# Patient Record
(demographics unavailable — no encounter records)

---

## 2024-10-15 NOTE — PROCEDURE
[FreeTextEntry1] : PFTs demonstrate airways obstruction with nonspecific bronchodilator response Increased lung volumes noted mild gas exchange impairment that corrects for volume Chest CT reviewed pleural-based irregular mass in right upper lung zone subjacent to right breast suggestive of injury from prior breast radiation.  Mild bronchiectasis noted Chest x-ray demonstrates linear opacity in right upper lobe

## 2024-10-15 NOTE — REASON FOR VISIT
[Initial] : an initial visit [Abnormal CXR/ Chest CT] : an abnormal CXR/ chest CT [Other: _____] : [unfilled]

## 2024-10-15 NOTE — HISTORY OF PRESENT ILLNESS
[Never] : never [TextBox_4] : 70 yr old woman with a history of bilateral breast intraductal carcinoma s/p radiation treatment more than 20 years ago, presenting with concern over new CT Chest scan and chronic difficulty breathing. Recent chest CT scan performed revealed a right, anterior upper lobe opacity near the pleural lining, sent in from PCP with concern about possible malignancy. Patient denies pleuritic chest pain on inspiration or recent fevers. Patient also remarks about dyspnea she has had since she was 9, reporting either a severe encephalitic or celullitis infection, causing her to have breathing difficulties and dysphagia. She describes her having the sensation of not being able to catch a "sufficiently deep" breath, though when she does she will feel better for several minutes. Reports wheezes at times. Endorses orthopnea at night, worsens at night time overall, and has been feeling congested in her lungs. With regards to her dysphagia, she notes having to chew in a special manner to avoid choking.   Radiation therapy to the right breast was done over 20 years ago.  Patient denies any recent change in her respiratory symptoms.  Was once treated for bronchitis with an inhaler that worked really well. She reports multiple prior normal chest x-rays during routine physicals

## 2024-10-15 NOTE — ASSESSMENT
[FreeTextEntry1] : 70-year-old female with 2 separate issues 1) abnormality on chest CT and chest x-ray.  Most likely consistent with prior radiation.  However it would be surprising that this was not documented on reportedly multiple previous x-rays.  Have asked family to attempt to obtain prior chest x-rays.  If no x-ray can be obtained would obtain PET scan, although most likely this is a postradiation injury and unlikely to be a cause of clinical concern.  2) shortness of breath.  Likely related to asthma will give 1 month trial of inhaler

## 2024-10-15 NOTE — PHYSICAL EXAM
[No Acute Distress] : no acute distress [Normal Oropharynx] : normal oropharynx [Normal Appearance] : normal appearance [No Neck Mass] : no neck mass [Normal S1, S2] : normal s1, s2 [No Murmurs] : no murmurs [No Resp Distress] : no resp distress [Clear to Auscultation Bilaterally] : clear to auscultation bilaterally

## 2024-11-19 NOTE — HISTORY OF PRESENT ILLNESS
[Never] : never [TextBox_4] : Prior:  70 yr old woman with a history of bilateral breast intraductal carcinoma s/p radiation treatment more than 20 years ago, presenting with concern over new CT Chest scan and chronic difficulty breathing. Recent chest CT scan performed revealed a right, anterior upper lobe opacity near the pleural lining, sent in from PCP with concern about possible malignancy. Patient denies pleuritic chest pain on inspiration or recent fevers. Patient also remarks about dyspnea she has had since she was 9, reporting either a severe encephalitic or celullitis infection, causing her to have breathing difficulties and dysphagia. She describes her having the sensation of not being able to catch a "sufficiently deep" breath, though when she does she will feel better for several minutes. Reports wheezes at times. Endorses orthopnea at night, worsens at night time overall, and has been feeling congested in her lungs. With regards to her dysphagia, she notes having to chew in a special manner to avoid choking.   Radiation therapy to the right breast was done over 20 years ago.  Patient denies any recent change in her respiratory symptoms.  Was once treated for bronchitis with an inhaler that worked really well. She reports multiple prior normal chest x-rays during routine physicals  Current: Continues on Arnuity inhaler ran out 2 days ago. Does not notice much change in breathing Could not find prior x-rays for review.

## 2024-11-19 NOTE — REASON FOR VISIT
[Follow-Up] : a follow-up visit [Abnormal CXR/ Chest CT] : an abnormal CXR/ chest CT [Other: _____] : [unfilled]

## 2024-11-19 NOTE — ASSESSMENT
Writer received call back from pt's son Lonnie. Writer offered first available consult appt of 1/3. Lonnie stated he did not want pt waiting that long. Writer offered placing the pt on a cancellation list and offered numbers for other wound clinics. Writer provided wound clinic numbers for [facility], and [facility]. Pt placed on cancellation list.   [FreeTextEntry1] : Lung function improved on Arnuity.  Still not normalized.  Will change to Trelegy 200, samples provided Large component of anxiety and dysfunctional breathing contributing to symptoms.  Discussed and demonstrated abdominal breathing techniques.  In terms of imaging patient does not wish to proceed with PET/CT because she is concerned about radiation exposure.  She did agree to a 3-month interval follow-up chest x-ray

## 2024-12-19 NOTE — ADDENDUM
[FreeTextEntry1] :  I, Morgan Corderotoriin, acted solely as a scribe for Dr. George Myles M.D. on this date 12/19/2024.   All medical record entries made by the Scribe were at my, Dr. George Myles M.D., direction and personally dictated by me on 12/19/2024. I have reviewed the chart and agree that the record accurately reflects my personal performance of the history, physical exam, assessment and plan. I have also personally directed, reviewed, and agreed with the chart.

## 2024-12-19 NOTE — ASSESSMENT
[FreeTextEntry1] : Demonstrated and practiced abdominal breathing techniques with the patient during today's visit. Recommended incorporating a 20-minute daily walk with a focus on maintaining proper breathing form. Discussed the use of an incentive spirometer to encourage active and effective breathing. Advised the patient to continue taking Arnuity as prescribed. Scheduled a follow-up appointment in three months

## 2024-12-19 NOTE — HISTORY OF PRESENT ILLNESS
[Never] : never [TextBox_4] : Prior:  70 yr old woman with a history of bilateral breast intraductal carcinoma s/p radiation treatment more than 20 years ago, presenting with concern over new CT Chest scan and chronic difficulty breathing. Recent chest CT scan performed revealed a right, anterior upper lobe opacity near the pleural lining, sent in from PCP with concern about possible malignancy. Patient denies pleuritic chest pain on inspiration or recent fevers. Patient also remarks about dyspnea she has had since she was 9, reporting either a severe encephalitic or celullitis infection, causing her to have breathing difficulties and dysphagia. She describes her having the sensation of not being able to catch a "sufficiently deep" breath, though when she does she will feel better for several minutes. Reports wheezes at times. Endorses orthopnea at night, worsens at night time overall, and has been feeling congested in her lungs. With regards to her dysphagia, she notes having to chew in a special manner to avoid choking. Radiation therapy to the right breast was done over 20 years ago. Patient denies any recent change in her respiratory symptoms.  Was once treated for bronchitis with an inhaler that worked really well. She reports multiple prior normal chest x-rays during routine physicals  Current:  DIANA BRAVO is a 70 year old female, with history of asthma, ventricular tachycardia who presents to the office for follow up evaluation. Patient reports consistent use of Arnuity and has not noticed significant changes in breathing. She is hesitant to start Trelegy due to concerns about Vilanterol. Additionally, she reports a chronic runny nose that worsens with bending down and physical activity. She remains active, regularly walking her dog. Regularly monitored by cardiology for PAC and PVC.  Continues with Arnuity inhaler

## 2025-01-30 NOTE — HISTORY OF PRESENT ILLNESS
[Never] : never [TextBox_4] : Prior:  DIANA BRAVO is a 70 year old female, with history of asthma, ventricular tachycardia who presents to the office for follow up evaluation. Patient reports consistent use of Arnuity and has not noticed significant changes in breathing. She is hesitant to start Trelegy due to concerns about Vilanterol. Additionally, she reports a chronic runny nose that worsens with bending down and physical activity. She remains active, regularly walking her dog. Regularly monitored by cardiology for PAC and PVC.  Continues with Arnuity inhaler  Current:  DIANA BRAVO is [id number] female with a history of asthma and tachycardia presents for follow-up evaluation. Uses an incentive spirometer daily but the one she purchased is not appropriately scaled for her lung capacity. Reports that the Arnuity inhaler is not providing sufficient relief

## 2025-01-30 NOTE — ADDENDUM
[FreeTextEntry1] :  I, Morgan Corderotoriin, acted solely as a scribe for Dr. George Myles M.D. on this date 01/30/2025.    All medical record entries made by the Scribe were at my, Dr. George Myles M.D., direction and personally dictated by me on 01/30/2025. I have reviewed the chart and agree that the record accurately reflects my personal performance of the history, physical exam, assessment and plan. I have also personally directed, reviewed, and agreed with the chart.

## 2025-01-30 NOTE — REASON FOR VISIT
[Follow-Up] : a follow-up visit [Abnormal CXR/ Chest CT] : an abnormal CXR/ chest CT [Other: _____] : [unfilled] [Spouse] : spouse

## 2025-01-30 NOTE — HISTORY OF PRESENT ILLNESS
[Never] : never [TextBox_4] : Prior:  DIANA BRAVO is a 70 year old female, with history of asthma, ventricular tachycardia who presents to the office for follow up evaluation. Patient reports consistent use of Arnuity and has not noticed significant changes in breathing. She is hesitant to start Trelegy due to concerns about Vilanterol. Additionally, she reports a chronic runny nose that worsens with bending down and physical activity. She remains active, regularly walking her dog. Regularly monitored by cardiology for PAC and PVC.  Continues with Arnuity inhaler  Current:  DIANA BRAVO is [id number] female with a history of asthma and tachycardia presents for follow-up evaluation. Uses an incentive spirometer daily but the one she purchased is not appropriately scaled for her lung capacity. Reports that the Arnuity inhaler is not providing sufficient relief

## 2025-01-30 NOTE — ASSESSMENT
[FreeTextEntry1] : Chest X-ray performed in-office today demonstrates significant interval changes compared to prior imaging. Chest CT ordered for further evaluation. Initiating Breo Ellipta 1 inhalation daily For persistent asthma symptoms.I did specifically ask her to check with her cardiologist if it would be appropriate for her to start on Breo.  Patient relates many of her symptoms to a childhood illness with multi neurological issues including dysphagia coughing and unsteadiness.  I explained to her that I believe her lung problems are not related to this and more likely related to simple asthma.  She exhibited significant reluctance to go for chest CT and I explained to the importance of doing this and following up in the abnormal finding as chest x-rays have not showed consistent results  Recorded time for visit excludes procedures done in office and includes review of outside reports and imaging if appropriate and indicated above